# Patient Record
Sex: FEMALE | URBAN - METROPOLITAN AREA
[De-identification: names, ages, dates, MRNs, and addresses within clinical notes are randomized per-mention and may not be internally consistent; named-entity substitution may affect disease eponyms.]

---

## 2023-09-14 ENCOUNTER — APPOINTMENT (RX ONLY)
Dept: URBAN - METROPOLITAN AREA CLINIC 15 | Facility: CLINIC | Age: 53
Setting detail: DERMATOLOGY
End: 2023-09-14

## 2023-09-14 DIAGNOSIS — Z41.9 ENCOUNTER FOR PROCEDURE FOR PURPOSES OTHER THAN REMEDYING HEALTH STATE, UNSPECIFIED: ICD-10-CM

## 2023-09-14 PROCEDURE — ? BOTOX

## 2023-09-14 NOTE — PROCEDURE: BOTOX
Show Mentalis Units: No
Levator Labii Superioris Units: 0
Show Additional Area 3: Yes
Reconstitution Date (Optional): 9/14/23
Post-Care Instructions: Patient instructed to not lie down for 4 hours and limit physical activity for 24 hours.
Comments: Pt tolerated well,no issues. She will call for 3 mo Botox visit.
Periorbital Skin Units: 22
Incrementing Botox Units: By 0.5 Units
Expiration Date (Month Year): 05/2026
Detail Level: Detailed
Glabellar Complex Units: 20
Price (Use Numbers Only, No Special Characters Or $): 413
Forehead Units: 8
Lot #: [id number]
Dilution (U/0.1 Cc): 5
Consent: Written consent obtained. Risks include but not limited to lid/brow ptosis, bruising, swelling, diplopia, temporary effect, incomplete chemical denervation.